# Patient Record
Sex: MALE | Race: WHITE
[De-identification: names, ages, dates, MRNs, and addresses within clinical notes are randomized per-mention and may not be internally consistent; named-entity substitution may affect disease eponyms.]

---

## 2020-03-10 ENCOUNTER — HOSPITAL ENCOUNTER (EMERGENCY)
Dept: HOSPITAL 41 - JD.ED | Age: 35
Discharge: HOME | End: 2020-03-10
Payer: COMMERCIAL

## 2020-03-10 DIAGNOSIS — A09: Primary | ICD-10-CM

## 2020-03-10 DIAGNOSIS — B96.89: ICD-10-CM

## 2020-03-10 DIAGNOSIS — F17.210: ICD-10-CM

## 2020-03-10 PROCEDURE — 96375 TX/PRO/DX INJ NEW DRUG ADDON: CPT

## 2020-03-10 PROCEDURE — 85007 BL SMEAR W/DIFF WBC COUNT: CPT

## 2020-03-10 PROCEDURE — 96361 HYDRATE IV INFUSION ADD-ON: CPT

## 2020-03-10 PROCEDURE — 86140 C-REACTIVE PROTEIN: CPT

## 2020-03-10 PROCEDURE — 99284 EMERGENCY DEPT VISIT MOD MDM: CPT

## 2020-03-10 PROCEDURE — 96374 THER/PROPH/DIAG INJ IV PUSH: CPT

## 2020-03-10 PROCEDURE — 36415 COLL VENOUS BLD VENIPUNCTURE: CPT

## 2020-03-10 PROCEDURE — 85027 COMPLETE CBC AUTOMATED: CPT

## 2020-03-10 PROCEDURE — 80053 COMPREHEN METABOLIC PANEL: CPT

## 2020-03-10 NOTE — EDM.PDOC
ED HPI GENERAL MEDICAL PROBLEM





- General


Chief Complaint: Abdominal Pain


Stated Complaint: FEVER AND DIARRHEA


Time Seen by Provider: 03/10/20 12:20


Source of Information: Reports: Patient, Family (mother )


History Limitations: Reports: No Limitations





- History of Present Illness


INITIAL COMMENTS - FREE TEXT/NARRATIVE: 





34-year-old male presents to the ED with acute onset of nausea vomiting and 

diarrhea starting on Sunday morning for 1/7.  He states he ate some chicken 

[employer] the night before and he believes they may have made him sick.  Since 

that time he has had intermittent nausea vomiting of bilious material and he 

has had diarrhea with flecks of blood and mucus.  This morning he has a 

constant feeling of need to defecate with mucus and blood only suggesting 

dysentery.  He is also had fever and chills up to 102 degrees.  He has not been 

able to eat or drink much at all for the last 2 days.  Was feeling dizzy and 

lightheaded the last 2 days but not so bad today.  Today he had some food that 

actually stayed down for the first time.  He had intermittent mild diffuse 

lower abdominal cramping pain.  Last emesis was yesterday afternoon.  He had 

passage of bloody mucus stool around 2 or 100 hours this morning, 0500 hrs. 

this morning and again here in the ED.


Onset: Sudden


Onset Date: 03/08/20


Onset Time: 06:00


Duration: Day(s):


Location: Reports: Abdomen (Pain nausea vomiting diarrhea with blood and mucus.)


Quality: Reports: Other


Severity: Moderate (Treatment lower abdominal cramping pain)


Improves with: Reports: None


Worsens with: Reports: Eating


Context: Reports: Other (Suspect bad food ingestion chicken tacos for supper on 

Saturday, February 7).  Denies: Activity (Eating seem to make things worse this 

morning.), Exercise, Lifting, Sick Contact, Trauma


Associated Symptoms: Reports: Fever/Chills, Loss of Appetite, Malaise, Nausea/

Vomiting.  Denies: Confusion, Chest Pain, Cough, Diaphoresis, Headaches, Rash, 

Seizure, Shortness of Breath, Syncope, Other


Treatments PTA: Reports: Other (see below) (None.)


  ** Lower Abdomen


Pain Score (Numeric/FACES): 3





- Related Data


 Allergies











Allergy/AdvReac Type Severity Reaction Status Date / Time


 


No Known Allergies Allergy   Verified 11/15/14 13:30











Home Meds: 


 Home Meds





Ciprofloxacin HCl [Cipro] 500 mg PO BID #12 tablet 03/10/20 [Rx]


Dicyclomine [Bentyl] 20 mg PO Q6H PRN #5 tablet 03/10/20 [Rx]


Lansoprazole [Prevacid] 30 mg PO DAILY 03/10/20 [History]


Ondansetron [Zofran] 4 mg BUCCAL Q6H PRN #5 tab 03/10/20 [Rx]











Social & Family History





- Tobacco Use


Smoking Status *Q: Current Every Day Smoker


Years of Tobacco use: 16


Packs/Tins Daily: 1





- Caffeine Use


Caffeine Use: Reports: Coffee, Soda, Tea





- Recreational Drug Use


Recreational Drug Type: Reports: Marijuana/Hashish





- Living Situation & Occupation


Living situation: Reports: Single


Occupation: Unemployed





ED ROS GENERAL





- Review of Systems


Review Of Systems: See Below


Constitutional: Reports: Fever, Chills, Malaise, Weakness, Fatigue, Decreased 

Appetite, Weight Loss


HEENT: Reports: No Symptoms


Respiratory: Reports: No Symptoms


Cardiovascular: Reports: No Symptoms


Endocrine: Reports: No Symptoms


GI/Abdominal: Reports: Abdominal Pain (Remittent lower abdominal cramping pain)

, Diarrhea (No vomiting since yesterday mid afternoon.  Mucousy bloody diarrhea)

, Nausea, Vomiting


: Reports: Other


Musculoskeletal: Reports: No Symptoms (Dark matheus-colored urine.)


Skin: Reports: No Symptoms


Neurological: Reports: Dizziness, Weakness (No dizziness yesterday)


Psychiatric: Reports: No Symptoms





ED EXAM, GI/ABD





- Physical Exam


Exam: See Below


Exam Limited By: No Limitations


General Appearance: Alert, WD/WN, No Apparent Distress, Other (Temperature is 

36.6.  Pulse 63 in sinus respiratory is 20 /70 pulse ox 100% on room air.)


Eyes: Bilateral: Normal Appearance (No scleral icterus and no blepharal pallor.)


Throat/Mouth: Other (Tongue is mildly dry and coated)


Head: Atraumatic, Normocephalic


Neck: Normal Inspection, Supple, Non-Tender, Full Range of Motion.  No: 

Lymphadenopathy (L), Lymphadenopathy (R)


Respiratory/Chest: No Respiratory Distress, Lungs Clear, Normal Breath Sounds, 

No Accessory Muscle Use, Chest Non-Tender


Cardiovascular: Normal Peripheral Pulses, Regular Rate, Rhythm, No Edema, No 

Gallop, No Murmur, No Rub


GI/Abdominal Exam: Normal Bowel Sounds, Soft, Non-Tender, No Organomegaly, No 

Abnormal Bruit, No Mass, Pelvis Stable.  No: Guarding, Rigid, Rebound


 (Male) Exam: No Hernia


Back Exam: Normal Inspection, Full Range of Motion.  No: CVA Tenderness (L), 

CVA Tenderness (R)


Extremities: Normal Inspection, Normal Range of Motion, Non-Tender, No Pedal 

Edema


Neurological: Alert, Oriented, CN II-XII Intact, Normal Cognition


Psychiatric: Normal Affect, Normal Mood


Skin Exam: Warm, Dry, Intact, Normal Color, No Rash





Course





- Vital Signs


Last Recorded V/S: 


 Last Vital Signs











Temp  36.6 C   03/10/20 11:28


 


Pulse  63   03/10/20 11:28


 


Resp  20   03/10/20 11:28


 


BP  120/70   03/10/20 11:28


 


Pulse Ox  100   03/10/20 11:28














- Orders/Labs/Meds


Orders: 


 Active Orders 24 hr











 Category Date Time Status


 


 Dextrose 5%-Lactated Ringers 1,000 ml Med  03/10/20 12:30 Active





 IV ASDIRECTED   








 Medication Orders





Dextrose/Lactated Ringer's (Dextrose 5%-Lactated Ringers)  1,000 mls @ 999 mls/

hr IV ASDIRECTED RODRIGO


   Last Admin: 03/10/20 12:35  Dose: 999 mls/hr








Labs: 


 Laboratory Tests











  03/10/20 03/10/20 Range/Units





  12:25 12:25 


 


WBC  10.64 H   (4.23-9.07)  K/mm3


 


RBC  5.19   (4.63-6.08)  M/mm3


 


Hgb  16.1   (13.7-17.5)  gm/dl


 


Hct  47.5   (40.1-51.0)  %


 


MCV  91.5   (79.0-92.2)  fl


 


MCH  31.0   (25.7-32.2)  pg


 


MCHC  33.9   (32.2-35.5)  g/dl


 


RDW Std Deviation  45.8 H   (35.1-43.9)  fL


 


Plt Count  215   (163-337)  K/mm3


 


MPV  10.0   (9.4-12.3)  fl


 


Neutrophils % (Manual)  85 H   (40-60)  %


 


Band Neutrophils %  0   (0-10)  %


 


Lymphocytes % (Manual)  10 L   (20-40)  %


 


Atypical Lymphs %  0   %


 


Immat Monocytes % (Man)  0   


 


Monocytes % (Manual)  5   (2-10)  %


 


Eosinophils % (Manual)  0 L   (0.8-7.0)  %


 


Basophils % (Manual)  0 L   (0.2-1.2)  


 


Metamyelocytes %  0   


 


Myelocytes %  0   


 


Promyelocytes %  0   


 


Blast Cells %  0   


 


Plasma Cell % (Manual)  0   


 


Nucleated RBCs  0.0   %


 


Platelet Estimate  Adequate   


 


RBC Morph Comment  Normal   


 


Sodium   139  (136-145)  mEq/L


 


Potassium   3.9  (3.5-5.1)  mEq/L


 


Chloride   103  ()  mEq/L


 


Carbon Dioxide   24  (21-32)  mEq/L


 


Anion Gap   15.9 H  (5-15)  


 


BUN   19 H  (7-18)  mg/dL


 


Creatinine   0.8  (0.7-1.3)  mg/dL


 


Est Cr Clr Drug Dosing   131.83  mL/min


 


Estimated GFR (MDRD)   > 60  (>60)  mL/min


 


BUN/Creatinine Ratio   23.8 H  (14-18)  


 


Glucose   105  ()  mg/dL


 


Calcium   9.7  (8.5-10.1)  mg/dL


 


Total Bilirubin   0.4  (0.2-1.0)  mg/dL


 


AST   17  (15-37)  U/L


 


ALT   29  (16-63)  U/L


 


Alkaline Phosphatase   57  ()  U/L


 


C-Reactive Protein   9.0 H*  (<1.0)  mg/dL


 


Total Protein   7.9  (6.4-8.2)  g/dl


 


Albumin   4.0  (3.4-5.0)  g/dl


 


Globulin   3.9  gm/dL


 


Albumin/Globulin Ratio   1.0  (1-2)  











Meds: 


Medications











Generic Name Dose Route Start Last Admin





  Trade Name Freq  PRN Reason Stop Dose Admin


 


Dextrose/Lactated Ringer's  1,000 mls @ 999 mls/hr  03/10/20 12:30  03/10/20 12:

35





  Dextrose 5%-Lactated Ringers  IV   999 mls/hr





  ASDIRECTED RODRIGO   Administration





     





     





     





     














Discontinued Medications














Generic Name Dose Route Start Last Admin





  Trade Name Freq  PRN Reason Stop Dose Admin


 


Hydromorphone HCl  0.5 mg  03/10/20 12:21  03/10/20 12:32





  Dilaudid  IVPUSH  03/10/20 12:22  0.5 mg





  ONETIME ONE   Administration





     





     





     





     


 


Metoclopramide HCl  7.5 mg  03/10/20 12:21  03/10/20 12:31





  Reglan  IVPUSH  03/10/20 12:22  7.5 mg





  ONETIME ONE   Administration





     





     





     





     














- Radiology Interpretation


Free Text/Narrative:: 


34-year-old male presents to the ED with a history of acute onset nausea 

vomiting and diarrhea starting Jeffry morning February 8.  He ate chicken tacos 

for supper the night prior and we suspect this may have caused his current 

illness a foodborne illness.  He had nausea vomiting all day Sunday and Monday 

with last emesis yesterday afternoon.  He continues to have small quantity 

mucousy bloody stools or dysentery overnight and yesterday.  Remain diffuse 

lower abdominal cramping pain.  Plan IV D5LR at open.  Stool for culture and 

sensitivity.








- Re-Assessments/Exams


Free Text/Narrative Re-Assessment/Exam: 





03/10/20 13:21 White count is 10.64 with 85% neutrophils.  No band cells 

reported.  Hemoglobin is 16.1 with hematocrit of 47.5 suggesting mild 

hemoconcentration platelet count 215,000.  Sodium is 139 with a potassium of 

3.9.  Chloride is 103 with a bicarb of 24.  Anion gap is 15.9.  BUN is 19 with 

a creatinine of 0.8.  GFR is greater than 60.  Glucose is 105 with a calcium of 

9.7.  Liver function is normal C-reactive protein is 9.0.  Definitely 

suggesting a bacterial etiology to his dysentery.  Total protein 7.9 with an 

albumin fraction of 4.0





03/10/20 14:07 he was never able to pass another stool and therefore stool 

culture was never obtained.  His labs support a bacterial enteritis.  Plan will 

be to treat him with Zofran 4 mg under the tongue every 6 hours necessary for 

relief of further nausea or vomiting.  Bentyl 20 mg by mouth every 6 hours as 

necessary for relief of abdominal cramping pain and antibiotic Cipro 500 mg 

twice daily for the next 6 days to clear up suspect Campylobacter 

gastroenteritis.  Follow-up if not improved in 72 hours time.








Departure





- Departure


Time of Disposition: 14:00


Disposition: Home, Self-Care 01


Condition: Fair


Clinical Impression: 


 Bacterial dysentery








- Discharge Information


*PRESCRIPTION DRUG MONITORING PROGRAM REVIEWED*: Not Applicable


*COPY OF PRESCRIPTION DRUG MONITORING REPORT IN PATIENT JASPREET: Not Applicable


Prescriptions: 


Ciprofloxacin HCl [Cipro] 500 mg PO BID #12 tablet


Dicyclomine [Bentyl] 20 mg PO Q6H PRN #5 tablet


 PRN Reason: Abdominal cramps/diarrhea


Ondansetron [Zofran] 4 mg BUCCAL Q6H PRN #5 tab


 PRN Reason: nausea or vomiting


Instructions:  Campylobacter Gastroenteritis


Referrals: 


Gi Calvin, NP [Primary Care Provider] - 


Forms:  ED Department Discharge, ED Return to Work/School Form


Additional Instructions: 


Evaluation in the emergency room today in regards to acute onset of nausea 

vomiting and diarrhea that contains blood since eating some chicken tacos on 

Saturday evening.  Labs support a bacterial cause to your diarrhea and 

vomiting.  Treatment is therefore a liter of IV fluids to rehydrate you in the 

ED.  You were discharged with medications Zofran 4 mg under the tongue every 6 

hours as needed for relief of nausea or vomiting.  Bentyl 20 mg tablet every 6 

hours as needed for relief of abdominal pain or cramping.  And antibiotic Cipro 

500 mg twice daily for the next 6 days to clear up suspect Campylobacter 

gastroenteritis.  This is a foodborne illness from undercooked chicken.  That 

marked improvement over the next 48 hours.  You should avoid all dairy products 

and no apple juice or grape juice until stools are formed back up.  Plenty of 

Gatorade/Powerade and advance to soup later today and tomorrow.





Sepsis Event Note





- Evaluation


Sepsis Screening Result: No Definite Risk





- Focused Exam


Vital Signs: 


 Vital Signs











  Temp Pulse Resp BP Pulse Ox


 


 03/10/20 11:28  36.6 C  63  20  120/70  100











Date Exam was Performed: 03/10/20


Time Exam was Performed: 14:07





- My Orders


Last 24 Hours: 


My Active Orders





03/10/20 12:30


Dextrose 5%-Lactated Ringers 1,000 ml IV ASDIRECTED 














- Assessment/Plan


Last 24 Hours: 


My Active Orders





03/10/20 12:30


Dextrose 5%-Lactated Ringers 1,000 ml IV ASDIRECTED